# Patient Record
Sex: MALE | Race: BLACK OR AFRICAN AMERICAN | Employment: UNEMPLOYED | URBAN - METROPOLITAN AREA
[De-identification: names, ages, dates, MRNs, and addresses within clinical notes are randomized per-mention and may not be internally consistent; named-entity substitution may affect disease eponyms.]

---
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
## 2025-05-26 ENCOUNTER — HOSPITAL ENCOUNTER (EMERGENCY)
Facility: CLINIC | Age: 20
Discharge: HOME | End: 2025-05-26
Attending: STUDENT IN AN ORGANIZED HEALTH CARE EDUCATION/TRAINING PROGRAM

## 2025-05-26 VITALS
OXYGEN SATURATION: 100 % | BODY MASS INDEX: 25.48 KG/M2 | HEART RATE: 113 BPM | RESPIRATION RATE: 16 BRPM | DIASTOLIC BLOOD PRESSURE: 84 MMHG | HEIGHT: 70 IN | SYSTOLIC BLOOD PRESSURE: 138 MMHG | TEMPERATURE: 98.4 F | WEIGHT: 178 LBS

## 2025-05-26 DIAGNOSIS — L05.01 PILONIDAL ABSCESS: Primary | ICD-10-CM

## 2025-05-26 PROCEDURE — 6370000000 HC RX 637 (ALT 250 FOR IP): Performed by: STUDENT IN AN ORGANIZED HEALTH CARE EDUCATION/TRAINING PROGRAM

## 2025-05-26 PROCEDURE — 6360000002 HC RX W HCPCS: Performed by: STUDENT IN AN ORGANIZED HEALTH CARE EDUCATION/TRAINING PROGRAM

## 2025-05-26 PROCEDURE — 99283 EMERGENCY DEPT VISIT LOW MDM: CPT

## 2025-05-26 PROCEDURE — 10080 I&D PILONIDAL CYST SIMPLE: CPT

## 2025-05-26 RX ORDER — LIDOCAINE HYDROCHLORIDE 10 MG/ML
20 INJECTION, SOLUTION INFILTRATION; PERINEURAL ONCE
Status: COMPLETED | OUTPATIENT
Start: 2025-05-26 | End: 2025-05-26

## 2025-05-26 RX ORDER — DOXYCYCLINE HYCLATE 100 MG
100 TABLET ORAL 2 TIMES DAILY
Qty: 14 TABLET | Refills: 0 | Status: SHIPPED | OUTPATIENT
Start: 2025-05-26 | End: 2025-06-02

## 2025-05-26 RX ORDER — DOXYCYCLINE 100 MG/1
100 CAPSULE ORAL ONCE
Status: COMPLETED | OUTPATIENT
Start: 2025-05-26 | End: 2025-05-26

## 2025-05-26 RX ADMIN — DOXYCYCLINE 100 MG: 100 CAPSULE ORAL at 19:37

## 2025-05-26 RX ADMIN — LIDOCAINE HYDROCHLORIDE 20 ML: 10 INJECTION, SOLUTION INFILTRATION; PERINEURAL at 19:38

## 2025-05-26 ASSESSMENT — ENCOUNTER SYMPTOMS
ABDOMINAL PAIN: 0
ROS SKIN COMMENTS: POSITIVE FOR ABSCESS

## 2025-05-26 ASSESSMENT — PAIN - FUNCTIONAL ASSESSMENT: PAIN_FUNCTIONAL_ASSESSMENT: 0-10

## 2025-05-26 ASSESSMENT — LIFESTYLE VARIABLES
HOW MANY STANDARD DRINKS CONTAINING ALCOHOL DO YOU HAVE ON A TYPICAL DAY: PATIENT DOES NOT DRINK
HOW OFTEN DO YOU HAVE A DRINK CONTAINING ALCOHOL: NEVER

## 2025-05-26 ASSESSMENT — PAIN SCALES - GENERAL: PAINLEVEL_OUTOF10: 9

## 2025-05-26 NOTE — DISCHARGE INSTRUCTIONS
We evaluated you for your symptoms.  You had an abscess, we opened this up and drained it.  This may continue to drain.  Ensure you keep the area clean and dry.  Complete the course of antibiotics.  Use Motrin and Tylenol as needed for symptoms.    Follow-up with your primary doctor.  Additionally please see a general surgeon as they may need to do a procedure and remove the cyst structure that got infected.    Return to the emergency department if you develop any worsening or concerning symptoms.

## 2025-05-26 NOTE — ED PROVIDER NOTES
[facility]  [address] [id number]  Phone: [phone]        [facility] EMERGENCY DEPARTMENT  EMERGENCY DEPARTMENT ENCOUNTER      Pt Name: Bonnie Vela  MRN: [id number]  Birthdate 2005  Date of evaluation: 5/26/2025  Provider: Dominique Munguia DO    CHIEF COMPLAINT       Chief Complaint   Patient presents with    Cyst     Started a week and a half ago, unable to sit. Presently on his buttocks.        HISTORY OF PRESENT ILLNESS   (Location/Symptom, Timing/Onset,Context/Setting, Quality, Duration, Modifying Factors, Severity)  Note limiting factors.     Bonnie Vela is a 19 y.o. male who presents to the emergency department pain over left upper buttock region.  Patient states has been hurting for about a week and a half.  States it is getting progressively worse, having significant pain with moving around or trying to sit down.  Still able to have bowel movements however is very painful, states his last bowel movement was a couple days ago.  No known fevers or chills.  Denies ever having anything like this in the past.  No history of abscesses, never required any I&D or anything like that in the past.  Patient did try some pain medication earlier today that he found at his grandparents house and he states it did help somewhat, took this earlier this morning.  Healthy patient otherwise.    Nursing Notes were reviewed.    REVIEW OF SYSTEMS       Review of Systems   Constitutional:  Negative for chills and fever.   Gastrointestinal:  Negative for abdominal pain.   Skin:         Positive for abscess       PAST MEDICAL HISTORY     No past medical history on file.    SURGICAL HISTORY       Past Surgical History:   Procedure Laterality Date    ADENOIDECTOMY      ARM SURGERY Right     TONSILLECTOMY      TYMPANOSTOMY TUBE PLACEMENT         CURRENT MEDICATIONS     Discharge Medication List as of 5/26/2025  7:54 PM          ALLERGIES       Patient has no known